# Patient Record
Sex: MALE | Race: BLACK OR AFRICAN AMERICAN | NOT HISPANIC OR LATINO | URBAN - METROPOLITAN AREA
[De-identification: names, ages, dates, MRNs, and addresses within clinical notes are randomized per-mention and may not be internally consistent; named-entity substitution may affect disease eponyms.]

---
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
## 2023-05-11 NOTE — ED ADULT NURSE NOTE - NS ED PATIENT SAFETY CONCERN
Orientation/Cognitive: A&OX4  Observation Goals (Met/ Not Met): Inpatient  Mobility Level/Assist Equipment: AX1  Fall Risk (Y/N): Yes  Behavior Concerns: None  Pain Management: Tylenol PRN  Tele/VS/O2: SR, VSS on RA  ABNL Lab/BG: See chart  Diet: Regular  Bowel/Bladder: Continent  Skin Concerns: Left forearm scare  Drains/Devices: PIV SL  Tests/Procedures for next shift: None  Anticipated DC date & active delays:  5/11/23       No